# Patient Record
Sex: MALE | Race: WHITE | Employment: STUDENT | URBAN - METROPOLITAN AREA
[De-identification: names, ages, dates, MRNs, and addresses within clinical notes are randomized per-mention and may not be internally consistent; named-entity substitution may affect disease eponyms.]

---

## 2024-10-07 ENCOUNTER — HOSPITAL ENCOUNTER (EMERGENCY)
Facility: HOSPITAL | Age: 19
Discharge: HOME | End: 2024-10-07
Payer: COMMERCIAL

## 2024-10-07 ENCOUNTER — APPOINTMENT (OUTPATIENT)
Facility: HOSPITAL | Age: 19
End: 2024-10-07
Payer: COMMERCIAL

## 2024-10-07 VITALS
OXYGEN SATURATION: 100 % | WEIGHT: 182.98 LBS | SYSTOLIC BLOOD PRESSURE: 126 MMHG | TEMPERATURE: 98.2 F | HEART RATE: 71 BPM | RESPIRATION RATE: 18 BRPM | DIASTOLIC BLOOD PRESSURE: 58 MMHG

## 2024-10-07 DIAGNOSIS — S61.213A LACERATION OF LEFT MIDDLE FINGER WITHOUT FOREIGN BODY WITHOUT DAMAGE TO NAIL, INITIAL ENCOUNTER: Primary | ICD-10-CM

## 2024-10-07 PROCEDURE — 90715 TDAP VACCINE 7 YRS/> IM: CPT

## 2024-10-07 PROCEDURE — 90471 IMMUNIZATION ADMIN: CPT

## 2024-10-07 PROCEDURE — 12001 RPR S/N/AX/GEN/TRNK 2.5CM/<: CPT

## 2024-10-07 PROCEDURE — 99284 EMERGENCY DEPT VISIT MOD MDM: CPT

## 2024-10-07 PROCEDURE — 6360000002 HC RX W HCPCS

## 2024-10-07 PROCEDURE — 2500000003 HC RX 250 WO HCPCS

## 2024-10-07 RX ORDER — IBUPROFEN 600 MG/1
600 TABLET, FILM COATED ORAL 3 TIMES DAILY PRN
Qty: 30 TABLET | Refills: 0 | Status: SHIPPED | OUTPATIENT
Start: 2024-10-07

## 2024-10-07 RX ORDER — BUPIVACAINE HYDROCHLORIDE 5 MG/ML
5 INJECTION, SOLUTION EPIDURAL; INTRACAUDAL
Status: COMPLETED | OUTPATIENT
Start: 2024-10-07 | End: 2024-10-07

## 2024-10-07 RX ORDER — LIDOCAINE HYDROCHLORIDE 10 MG/ML
5 INJECTION, SOLUTION EPIDURAL; INFILTRATION; INTRACAUDAL; PERINEURAL
Status: COMPLETED | OUTPATIENT
Start: 2024-10-07 | End: 2024-10-07

## 2024-10-07 RX ADMIN — LIDOCAINE HYDROCHLORIDE 5 ML: 10 INJECTION, SOLUTION EPIDURAL; INFILTRATION; INTRACAUDAL; PERINEURAL at 21:03

## 2024-10-07 RX ADMIN — BUPIVACAINE HYDROCHLORIDE 25 MG: 5 INJECTION, SOLUTION EPIDURAL; INTRACAUDAL; PERINEURAL at 21:03

## 2024-10-07 RX ADMIN — TETANUS TOXOID, REDUCED DIPHTHERIA TOXOID AND ACELLULAR PERTUSSIS VACCINE, ADSORBED 0.5 ML: 5; 2.5; 8; 8; 2.5 SUSPENSION INTRAMUSCULAR at 21:03

## 2024-10-07 ASSESSMENT — LIFESTYLE VARIABLES
HOW MANY STANDARD DRINKS CONTAINING ALCOHOL DO YOU HAVE ON A TYPICAL DAY: PATIENT DOES NOT DRINK
HOW OFTEN DO YOU HAVE A DRINK CONTAINING ALCOHOL: NEVER

## 2024-10-08 NOTE — ED PROVIDER NOTES
Reese Billings is a 19 y.o. male with no significant PMHx who presents to the ED today for concerns regarding cutting his left middle finger when he accidentally collapsed his knife on it.  Denies crushing injury.  Last Tdap unknown.    On exam, well-appearing 19-year-old  male sitting up in chair no acute distress.  2.5cm laceration to L middle finger on dorsal aspect-see image above.. FROM of the middle finger with full flexion and extension.  Distal sensation intact.  Cap refill less than 2 seconds.  Ordered Tdap IM, bupivacaine, and lidocaine.     Performed digital block and lac repair as noted below.  Discussed wound care and potential signs of infection.  Patient to follow-up in 10 to 14 days for suture removal.  Patient verbalized understanding.  No further requests at this time.    Procedure Note - Laceration Repair:  2202  Procedure by Yelitza Huang NP  Complexity: simple   2.5cm linear laceration to left middle finger  was irrigated copiously with NS under jet lavage, prepped with Betadine and draped in a sterile fashion.  The area was anesthetized via digital block of 6 mL 50-50 mixture Bupivacaine and lidocaine.  The wound was explored with the following results: No foreign bodies found.  The wound was repaired with One layer suture closure: Skin Layer:  3 sutures placed, stitch type:simple interrupted, suture: 4-0 nylon..  The wound was closed with good hemostasis and approximation.  Sterile dressing applied.  Estimated blood loss: minimal  The procedure took 16-30 minutes, and pt tolerated well.             FINAL IMPRESSION     1. Laceration of left middle finger without foreign body without damage to nail, initial encounter          DISPOSITION/PLAN   DISPOSITION Decision To Discharge 10/07/2024 10:21:17 PM  Condition at Disposition: Data Unavailable      Discharge Note: The patient is stable for discharge home. The signs, symptoms, diagnosis, and discharge instructions have been

## 2024-10-19 ENCOUNTER — HOSPITAL ENCOUNTER (EMERGENCY)
Facility: HOSPITAL | Age: 19
Discharge: HOME | End: 2024-10-19

## 2024-10-19 VITALS
HEART RATE: 59 BPM | SYSTOLIC BLOOD PRESSURE: 119 MMHG | BODY MASS INDEX: 22.28 KG/M2 | HEIGHT: 76 IN | RESPIRATION RATE: 16 BRPM | TEMPERATURE: 98.1 F | DIASTOLIC BLOOD PRESSURE: 63 MMHG | OXYGEN SATURATION: 100 % | WEIGHT: 182.98 LBS

## 2024-10-19 DIAGNOSIS — Z48.02 VISIT FOR SUTURE REMOVAL: Primary | ICD-10-CM

## 2024-10-19 NOTE — DISCHARGE INSTRUCTIONS
Thank You!    It was a pleasure taking care of you in our Emergency Department today. We know that when you come to [facility], you are entrusting us with your health, comfort, and safety. Our clinicians honor that trust, and truly appreciate the opportunity to care for you and your loved ones.    If you receive a survey about your Emergency Department experience today, please fill it out.  We value your feedback. Thank you.      Sary Da Silva PA-C    ___________________________________  I have included a copy of your lab results and/or radiologic studies from today's visit so you can have them easily available at your follow-up visit.   No results found for this or any previous visit (from the past 12 hour(s)).    No orders to display     [unfilled]

## 2024-10-19 NOTE — ED PROVIDER NOTES
[facility] EMERGENCY DEPT  EMERGENCY DEPARTMENT ENCOUNTER       Pt Name: Reese Billings  MRN: [id number]  Birthdate 2005  Date of evaluation: 10/19/2024  Provider: MILADY Oscar   PCP: No primary care provider on file.  Note Started: 11:28 AM EDT 10/19/24     CHIEF COMPLAINT       Chief Complaint   Patient presents with    Suture / Staple Removal     Pt presents ambulatory to ED via triage for suture removal from his left middle finger. Pt has no complaints att.        HISTORY OF PRESENT ILLNESS: 1 or more elements      History From: Patient  None     Reese Billings is a 19 y.o. male who presents to the ED for evaluation of suture removal. Patient had stitches placed on 10/7 to left middle finger. States he has been doing well. Denies pain, redness, drainage. States he is otherwise asymptomatic and in his usual state of health.      Nursing Notes were all reviewed and agreed with or any disagreements were addressed in the HPI.     REVIEW OF SYSTEMS      Review of Systems   All other systems reviewed and are negative.       Positives and Pertinent negatives as per HPI.    PAST HISTORY     Past Medical History:  No past medical history on file.    Past Surgical History:  No past surgical history on file.    Family History:  No family history on file.    Social History:       Allergies:  No Known Allergies        PHYSICAL EXAM      ED Triage Vitals [10/19/24 1109]   Encounter Vitals Group      /63      Systolic BP Percentile       Diastolic BP Percentile       Pulse 59      Respirations 16      Temp 98.1 °F (36.7 °C)      Temp Source Oral      SpO2 100 %      Weight - Scale 83 kg (182 lb 15.7 oz)      Height 1.93 m (6' 4\")      Head Circumference       Peak Flow       Pain Score       Pain Loc       Pain Education       Exclude from Growth Chart         Physical Exam  Constitutional:       Appearance: Normal appearance.   Cardiovascular:      Rate and Rhythm: Normal rate.      Pulses: Normal pulses.   Pulmonary:

## 2024-10-19 NOTE — ED NOTES
Discharge paperwork reviewed and provided to pt by provider. Time was given to ask any questions or concerns which there were none att.